# Patient Record
Sex: FEMALE | URBAN - METROPOLITAN AREA
[De-identification: names, ages, dates, MRNs, and addresses within clinical notes are randomized per-mention and may not be internally consistent; named-entity substitution may affect disease eponyms.]

---

## 2017-12-11 ENCOUNTER — APPOINTMENT (RX ONLY)
Dept: URBAN - METROPOLITAN AREA CLINIC 76 | Facility: CLINIC | Age: 52
Setting detail: DERMATOLOGY
End: 2017-12-11

## 2017-12-11 DIAGNOSIS — L73.8 OTHER SPECIFIED FOLLICULAR DISORDERS: ICD-10-CM

## 2017-12-11 DIAGNOSIS — Z87.2 PERSONAL HISTORY OF DISEASES OF THE SKIN AND SUBCUTANEOUS TISSUE: ICD-10-CM

## 2017-12-11 DIAGNOSIS — Z80.8 FAMILY HISTORY OF MALIGNANT NEOPLASM OF OTHER ORGANS OR SYSTEMS: ICD-10-CM

## 2017-12-11 DIAGNOSIS — Z71.89 OTHER SPECIFIED COUNSELING: ICD-10-CM

## 2017-12-11 DIAGNOSIS — L57.0 ACTINIC KERATOSIS: ICD-10-CM

## 2017-12-11 DIAGNOSIS — D18.0 HEMANGIOMA: ICD-10-CM

## 2017-12-11 DIAGNOSIS — L81.4 OTHER MELANIN HYPERPIGMENTATION: ICD-10-CM

## 2017-12-11 DIAGNOSIS — L82.1 OTHER SEBORRHEIC KERATOSIS: ICD-10-CM

## 2017-12-11 DIAGNOSIS — L71.8 OTHER ROSACEA: ICD-10-CM

## 2017-12-11 DIAGNOSIS — D22 MELANOCYTIC NEVI: ICD-10-CM

## 2017-12-11 PROBLEM — K21.9 GASTRO-ESOPHAGEAL REFLUX DISEASE WITHOUT ESOPHAGITIS: Status: ACTIVE | Noted: 2017-12-11

## 2017-12-11 PROBLEM — D18.01 HEMANGIOMA OF SKIN AND SUBCUTANEOUS TISSUE: Status: ACTIVE | Noted: 2017-12-11

## 2017-12-11 PROBLEM — D22.5 MELANOCYTIC NEVI OF TRUNK: Status: ACTIVE | Noted: 2017-12-11

## 2017-12-11 PROCEDURE — ? COUNSELING

## 2017-12-11 PROCEDURE — 17000 DESTRUCT PREMALG LESION: CPT

## 2017-12-11 PROCEDURE — ? IN-HOUSE DISPENSING PHARMACY

## 2017-12-11 PROCEDURE — 17003 DESTRUCT PREMALG LES 2-14: CPT

## 2017-12-11 PROCEDURE — 99203 OFFICE O/P NEW LOW 30 MIN: CPT | Mod: 25

## 2017-12-11 PROCEDURE — ? OBSERVATION

## 2017-12-11 PROCEDURE — ? LIQUID NITROGEN

## 2017-12-11 ASSESSMENT — LOCATION DETAILED DESCRIPTION DERM
LOCATION DETAILED: RIGHT MEDIAL MALAR CHEEK
LOCATION DETAILED: LEFT CLAVICULAR SKIN
LOCATION DETAILED: RIGHT SUPERIOR UPPER BACK
LOCATION DETAILED: LEFT ULNAR DORSAL HAND
LOCATION DETAILED: LEFT CENTRAL MALAR CHEEK
LOCATION DETAILED: LEFT SUPERIOR MEDIAL UPPER BACK
LOCATION DETAILED: RIGHT SUPERIOR MEDIAL UPPER BACK
LOCATION DETAILED: LEFT INFERIOR CENTRAL MALAR CHEEK
LOCATION DETAILED: RIGHT LATERAL ABDOMEN
LOCATION DETAILED: INFERIOR THORACIC SPINE
LOCATION DETAILED: EPIGASTRIC SKIN

## 2017-12-11 ASSESSMENT — LOCATION SIMPLE DESCRIPTION DERM
LOCATION SIMPLE: RIGHT UPPER BACK
LOCATION SIMPLE: LEFT CHEEK
LOCATION SIMPLE: LEFT CLAVICULAR SKIN
LOCATION SIMPLE: RIGHT CHEEK
LOCATION SIMPLE: UPPER BACK
LOCATION SIMPLE: ABDOMEN
LOCATION SIMPLE: LEFT HAND
LOCATION SIMPLE: LEFT UPPER BACK

## 2017-12-11 ASSESSMENT — LOCATION ZONE DERM
LOCATION ZONE: FACE
LOCATION ZONE: HAND
LOCATION ZONE: TRUNK

## 2017-12-11 NOTE — HPI: FULL BODY SKIN EXAMINATION
How Severe Are Your Spot(S)?: mild
What Is The Reason For Today's Visit?: Full Body Skin Examination
What Is The Reason For Today's Visit? (Being Monitored For X): the development of new lesions
Additional History: Pt has previous biopsy’s at another practice which she will fax over. Possible basal or squamous

## 2017-12-11 NOTE — PROCEDURE: LIQUID NITROGEN
Post-Care Instructions: I reviewed with the patient in detail post-care instructions. Patient is to wear sunprotection, and avoid picking at any of the treated lesions. Pt may apply Vaseline to crusted or scabbing areas.
Consent: The patient's consent was obtained including but not limited to risks of crusting, scabbing, blistering, scarring, darker or lighter pigmentary change, recurrence, incomplete removal and infection.
Render Post-Care Instructions In Note?: no
Duration Of Freeze Thaw-Cycle (Seconds): 3
Detail Level: Simple
Number Of Freeze-Thaw Cycles: 3 freeze-thaw cycles

## 2017-12-11 NOTE — PROCEDURE: IN-HOUSE DISPENSING PHARMACY
Product 49 Price/Unit (In Dollars): 0
Name Of Product 35: Tacro 0.1% Ointment - 241027
Name Of Product 41: Hydroquin 6% Combo Cream - 235542
Product 35 Price/Unit (In Dollars): 50.00
Product 18 Unit Type: mg
Name Of Product 21: Imiqui 5% / Levo 1% Gel - 718661
Product 1 Application Directions: Use as face or body wash daily.
Product 6 Application Directions: Apply to affected area one time a day.
Product 4 Application Directions: Apply to affected area after moisturizer one time a day.
Name Of Product 25: Triamcin 0.1% Ointment - 308349
Product 21 Application Directions: Apply to affected area in the evening or every other evening.
Name Of Product 6: Sod Sulfa 10% / Sulf 2% - 073963
Product 13 Application Directions: Apply to affected area two times a day.
Name Of Product 51: Anti- Fungal Nail Solution - 650370
Render Product Pricing In Note: Yes
Product 31 Units Dispensed: 1
Product 13 Price/Unit (In Dollars): 40.00
Name Of Product 1: Acne Body Wash - 895526
Name Of Product 8: Taza 0.1 Cream - 246734
Detail Level: Zone
Product 41 Refills: 3
Name Of Product 2: Tret 0.05% Cream - 756790
Name Of Product 4: Clind / Tret Combo Cream - 951963
Name Of Product 11: Clob 0.05% Solution - 053811
Name Of Product 3: Pietro / Clind Combo - 354281
Product 42 Application Directions: Apply to hyperpigmented areas in the evening after moisturizer.
Name Of Product 7: Acne Gel w/ Dap - 548055
Name Of Product 42: Kojic Melasma Cream - 178730
Product 2 Application Directions: Apply to affected area in the evening after moisturizer. Avoid eyelids.
Name Of Product 5: Adap Combo Cream - 773918
Product 31 Application Directions: Apply to affected area before moisturizer one time a day.
Name Of Product 12: Iodoquin / Maurisio Combo - 770048
Name Of Product 13: Clob 0.05% Cream - 655160
Product 51 Application Directions: Apply to affected nails daily for 10 months.
Name Of Product 14: Dermatitis Topical Foam - 725350
Product 3 Application Directions: Apply to acne prone area after moisturizer one time a day.
Name Of Product 31: Ivermec 1% / Met 1% Gel - 029154